# Patient Record
(demographics unavailable — no encounter records)

---

## 2020-01-01 NOTE — IPNPDOC
General


Date of Service:  2020


Day of Life:  2


Weight (G):  2762 (-122 g)





History


This child is a term female  who was admitted to the to the  

intensive care unit (NICU) from the delivery room for post-resuscitation


care and for treatment with IV antibiotics and evaluation for possible sepsis 

due to chorioamnionitis.  Mother is 23 years old,  2, now para 2.  Her 

blood type is O+.  Her group B strep screen was negative.  Her hepatitis B 

surface antigen, RPR and HIV status were all negative.  Rupture of membranes 

occurred 2 hours and 18 minutes prior to delivery with bloody and meconium-

stained amniotic fluid.  Labor was complicated by chorioamnionitis and 

nonreassuring fetal status. The child was delivered by  section (C-

section) under general anesthesia. A ruptured uterus was noted to be present at 

the time of delivery.  The child was given Apgar scores of 3 at one minute, 9 at

five minutes and 9 at ten minutes. She required bag and mask ventilation to 

establish a good respiratory effort and then admitted to the NICU for further 

care.





Vital Signs/I&O


Vital Signs





Vital Signs








  Date Time  Temp Pulse Resp B/P (MAP) Pulse Ox O2 Delivery O2 Flow Rate FiO2


 


20 07:28     100 HVNI-Vapotherm 3.0 21


 


20 05:00 97.2       


 


20 05:00  116 32 76/40 (52)    








Intake and Output











I & O 


 


 20





 06:00


 


Intake Total 256 ml


 


Output Total 190 ml


 


Balance 66 ml


 


 


 


Intake Oral 40 ml


 


IV Total 216 ml


 


Output Urine Total 190 ml


 


# Incontinent Voids 4


 


# Bowel Movements 8








Urine Output (Average mL/kg/hr:  3


Bowel Movements:  7





Physical Examination


Respiratory:  Positive: Good Bilateral Air Entry, Comfort Flow (3 L, 21 %)


Infectious Disease:  ampicillin, gentamicin


Cardiac:  Positive: S1, S2


Hematology:  Positive: hyperbilirubinemia, phototherapy


Metobolic/Abdominal:  Positive Soft


Neurological:  Positive: Good Tone


Extremities:  Positive: Full ROM Times 4


Skin:  Positive: Normal for Gestation





Laboratory Data


CBC/BMP/Bili





Laboratory Tests








Test


 20


20:53 20


06:26


 


Total Bilirubin


 4.5 MG/DL


(2.00-4.99) 5.0 MG/DL


(2.00-9.99)





Laboratory Tests


20 12:41








20 06:26











Feedings


What:  Formula





Other Medical Treatments


IV fluid, D10W at 80 ML/KG/day





Problems


Problems:  


(1) Liveborn by 


Assessment & Plan:  1. Baby is currently on IV fluids, D10W at 80 ML/KG/day.


2. Baby was spitting up with feeds, feeds changed to Similac sensitive with 

better tolerance.


3. Increase feeds to 10-15 ML by mouth every 3 hours





(2) Transient tachypnea of 


Assessment & Plan:  1. Baby developed respiratory distress soon after delivery.


2. Baby is currently on high flow nasal cannula 3 L, 21 %.





(3) Observation and evaluation of  for suspected infectious condition


Assessment & Plan:  1. Baby is on ampicillin 100 mg/kg per dose every 12 hours 

and gentamicin 4 mg/kg every 24 hours.


2. Blood culture is negative to date


3. Continue antibiotics and continue to follow blood culture, Gent trough in 

a.m.





(4)  hyperbilirubinemia


Assessment & Plan:  1. Baby had an elevated bilirubin level of 4.5 at 

approximately 12 hours of life and was started on phototherapy.


2. Continue phototherapy and continue to follow bilirubin levels








Current Medications





Current Medications








 Medications


  (Trade)  Dose


 Ordered  Sig/Daily


 Route


 PRN Reason  Start Time


 Stop Time Status Last Admin


Dose Admin


 


 Ampicillin Sodium


  (Omnipen)  140 mg  Q12H


 IV


   20 08:00


    20 08:58





 


 Dextrose  1,000 ml @ 


 9 mls/hr  Q24H


 IV


   20 06:50


    20 06:43





 


 Gentamicin


 Sulfate 11 mg/


 Dextrose  5.5 ml @ 


 10 mls/hr  Q24H


 IV


   20 07:00


    20 06:46














Allergies


Coded Allergies:  


     No Known Allergies (Unverified , 20)











ROSA AMES DO                2020 09:27

## 2020-01-01 NOTE — DS.PDOC
NICU Discharge Summary


General


Date of Birth


20


Date of Discharge


2020





Problem List


Problems:  


(1) Transient tachypnea of 


Problem text:  1. Baby developed respiratory distress soon after delivery.


2. Baby was placed on high flow nasal cannula which was weaned as tolerated and 

on day of life #3 baby was placed on room air.


3. Baby is currently breathing comfortably on room air in no distress.





(2) Liveborn by 


(3) Observation and evaluation of  for suspected infectious condition


Problem text:  1. Mother was diagnosed with chorioamnionitis during delivery.


2. CBC and blood culture were done and both were within normal limits.


3. Baby was treated for 5 days with ampicillin and gentamicin for suspected 

sepsis.


4. Baby is currently not showing any clinical signs or symptoms of  

sepsis.





(4)  hyperbilirubinemia


Problem text:  1. Baby had an elevated bilirubin level of 4.5 at approximately 

12 hours of life and was started on phototherapy.


2. Phototherapy was discontinued at a serum bilirubin level of 4.9, and rebound 

bilirubin level on 2020 is 6.2.








Procedures During Visit


Hearing screen and BiliChek were performed.





History


This child is a term female  who was admitted to the to the  

intensive care unit (NICU) from the delivery room for post-resuscitation


care and for treatment with IV antibiotics and evaluation for possible sepsis 

due to chorioamnionitis.  Mother is 23 years old,  2, now para 2.  Her 

blood type is O+.  Her group B strep screen was negative.  Her hepatitis B 

surface antigen, RPR and HIV status were all negative.  Rupture of membranes 

occurred 2 hours and 18 minutes prior to delivery with bloody and meconium-

stained amniotic fluid.  Labor was complicated by chorioamnionitis and 

nonreassuring fetal status. The child was delivered by  section (C-

section) under general anesthesia. A ruptured uterus was noted to be present at 

the time of delivery.  The child was given Apgar scores of 3 at one minute, 9 at

five minutes and 9 at ten minutes. She required bag and mask ventilation to 

establish a good respiratory effort and then admitted to the NICU for further 

care.





Physical Examination


Measurements on Admission


On admission, the baby's weight is 2838 grams, length is 51 cm, and head 

circumference is 33.5 cm.


General:  Positive: Active; 


   Negative: Respiratory Distress, Dysmorphic Features


HEENT:  Positive: Normocephalic, Anterior [place] Open, Positive Red Reflexes

Sav, Nares Patent, Ears Well Formed, Ears Well Set; 


   Negative: Cleft Lip, Cleft Palate


Heart:  Positive: S1,S2; 


   Negative: Murmur


Lungs:  Positive: Good Bilateral Air Entry; 


   Negative: Grunting and Retractions, Tachypnea


Abdomen:  Positive: Soft, Bowel sounds Present; 


   Negative: Distended


Female Genitalia:  Positive: Normal Term Genitalia


Anus:  Positive: Patent


Extremities:  Positive: Full ROM Times 4, Femoral Pulses; 


   Negative: Hip Click


Skin:  Positive: Normal for Gestation, Normal Capillary Refill


Neurological:  POSITIVE: Good Tone, Positive [place] Reflex, Positive Suck Reflex, 

Positive Grasp Reflex





Summary


On the day of discharge the baby's weight is 2832 g and the baby is tolerating 

full by mouth ad ye. feeds.


Baby is breathing comfortably on room air in no distress.


Physical exam is within normal limits.


The baby passed a  hearing screen and received the first dose of 

hepatitis B vaccine on 2020. The baby's blood type is O-.


The plan is to discharge the baby home with the mother and they will follow up 

with [facility] in 1-2 days.











ROSA AMES DO                2020 12:31

## 2020-01-01 NOTE — IPNPDOC
General


Date of Service:  2020


Day of Life:  1


Weight (G):  2884





History


This child is a term female  who was admitted to the to the  

intensive care unit (NICU) from the delivery room for post-resuscitation


care and for treatment with IV antibiotics and evaluation for possible sepsis 

due to chorioamnionitis.  Mother is 23 years old,  2, now para 2.  Her 

blood type is O+.  Her group B strep screen was negative.  Her hepatitis B 

surface antigen, RPR and HIV status were all negative.  Rupture of membranes 

occurred 2 hours and 18 minutes prior to delivery with bloody and meconium-

stained amniotic fluid.  Labor was complicated by chorioamnionitis and 

nonreassuring fetal status. The child was delivered by  section (C-

section) under general anesthesia. A ruptured uterus was noted to be present at 

the time of delivery.  The child was given Apgar scores of 3 at one minute, 9 at

five minutes and 9 at ten minutes. She required bag and mask ventilation to 

establish a good respiratory effort and then admitted to the NICU for further 

care.





Vital Signs/I&O


Vital Signs





Vital Signs








  Date Time  Temp Pulse Resp B/P (MAP) Pulse Ox O2 Delivery O2 Flow Rate FiO2


 


20 11:00 98.0 123 36 56/29 (38) 100 HVNI-Vapotherm 3.0 30








Intake and Output











I & O 


 


 20





 06:00


 


Intake Total 197.4 ml


 


Output Total 110 ml


 


Balance 87.4 ml


 


 


 


Intake Oral 20 ml


 


IV Total 177.4 ml


 


Output Urine Total 110 ml


 


# Incontinent Voids 2


 


# Bowel Movements 3








Urine Output (Average mL/kg/hr:  1.4


Bowel Movements:  5





Physical Examination


Respiratory:  Positive: Good Bilateral Air Entry, Comfort Flow (3 L, 30%)


Cardiac:  Positive: S1, S2


Hematology:  Positive: hyperbilirubinemia, phototherapy


Metobolic/Abdominal:  Positive Soft


Neurological:  Positive: Good Tone


Extremities:  Positive: Full ROM Times 4


Skin:  Positive: Normal for Gestation





Laboratory Data


CBC/BMP/Bili





Laboratory Tests








Test


 20


20:53 20


06:26


 


Total Bilirubin


 4.5 MG/DL


(2.00-4.99) 5.0 MG/DL


(2.00-9.99)





Laboratory Tests


20 12:41








20 06:26











Feedings


What:  Formula





Other Medical Treatments


IV fluids D10W at 80 ML's per KG per day





Problems


Problems:  


(1) Liveborn by 


(2) Transient tachypnea of 


Assessment & Plan:  1. Baby developed respiratory distress soon after delivery.


2. Baby is currently on high flow nasal cannula 3 L, 30%.


3. Wean FiO2 as tolerated.





(3) Observation and evaluation of  for suspected infectious condition


Assessment & Plan:  1. Baby is on ampicillin 100 mg/kg per dose every 12 hours 

and gentamicin 4 mg/kg every 24 hours.


2. Blood culture is negative to date


3. Continue antibiotics and continue to follow blood culture





(4)  hyperbilirubinemia


Assessment & Plan:  1. Baby had an elevated bilirubin level of 4.5 at 

approximately 12 hours of life and was started on phototherapy.


2. Continue phototherapy and continue to follow bilirubin levels








Current Medications





Current Medications








 Medications


  (Trade)  Dose


 Ordered  Sig/Daily


 Route


 PRN Reason  Start Time


 Stop Time Status Last Admin


Dose Admin


 


 Ampicillin Sodium


  (Omnipen)  140 mg  Q12H


 IV


   20 08:00


    20 07:39





 


 Dextrose  1,000 ml @ 


 9 mls/hr  Q24H


 IV


   20 06:50


    20 06:34





 


 Gentamicin


 Sulfate 11 mg/


 Dextrose  5.5 ml @ 


 10 mls/hr  Q24H


 IV


   20 07:00


    20 06:35




















ROSA AMES DO                2020 11:19

## 2020-01-01 NOTE — IPNPDOC
General


Date of Service:  2020


Day of Life:  5


Weight (G):  2794 (Plus 54 g)





History


This child is a term female  who was admitted to the to the  

intensive care unit (NICU) from the delivery room for post-resuscitation


care and for treatment with IV antibiotics and evaluation for possible sepsis 

due to chorioamnionitis.  Mother is 23 years old,  2, now para 2.  Her 

blood type is O+.  Her group B strep screen was negative.  Her hepatitis B 

surface antigen, RPR and HIV status were all negative.  Rupture of membranes 

occurred 2 hours and 18 minutes prior to delivery with bloody and meconium-

stained amniotic fluid.  Labor was complicated by chorioamnionitis and 

nonreassuring fetal status. The child was delivered by  section (C-

section) under general anesthesia. A ruptured uterus was noted to be present at 

the time of delivery.  The child was given Apgar scores of 3 at one minute, 9 at

five minutes and 9 at ten minutes. She required bag and mask ventilation to 

establish a good respiratory effort and then admitted to the NICU for further 

care.





Vital Signs/I&O


Vital Signs





Vital Signs








  Date Time  Temp Pulse Resp B/P (MAP) Pulse Ox O2 Delivery O2 Flow Rate FiO2


 


20 14:00 97.9 141 36  100 Room Air  


 


20 08:00    66/30 (42)    


 


20 08:00       3.0 21








Intake and Output











I & O 


 


 20





 06:00


 


Intake Total 495.4 ml


 


Output Total 320 ml


 


Balance 175.4 ml


 


 


 


Intake Oral 410 ml


 


IV Total 85.4 ml


 


Output Urine Total 320 ml


 


# Incontinent Voids 7


 


# Bowel Movements 6








Urine Output (Average mL/kg/hr:  4.3


Bowel Movements:  4





Physical Examination


Respiratory:  Positive: Good Bilateral Air Entry, Room Air


Infectious Disease:  ampicillin, gentamicin


Cardiac:  Positive: S1, S2


Hematology:  Positive: hyperbilirubinemia


Metobolic/Abdominal:  Positive Soft


Neurological:  Positive: Good Tone


Extremities:  Positive: Full ROM Times 4


Skin:  Positive: Normal for Gestation





Laboratory Data


CBC/BMP/Bili





Laboratory Tests








Test


 20


06:31


 


Total Bilirubin


 4.9 MG/DL


(2.00-12.00)











Feedings


What:  Formula





Problems


Problems:  


(1) Liveborn by 


Assessment & Plan:  1. Baby is currently on IV fluids, D10W at 3 ML/hour, 

discontinue IV fluids.


2. Baby is currently taking and tolerating Similac sensitive ad ye. every 3 

hours.


3. Continue ad ye. feeds and follow intake and tolerance





(2) Transient tachypnea of 


Assessment & Plan:  1. Baby developed respiratory distress soon after delivery.


2. Baby is currently on high flow nasal cannula 3 L, 21 %.


3. Try baby on room air.





(3) Observation and evaluation of  for suspected infectious condition


Assessment & Plan:  1. Baby is on ampicillin 100 mg/kg per dose every 12 hours 

and gentamicin 4 mg/kg every 24 hours.


2. Blood culture is negative to date


3. Discontinue antibiotics.





(4)  hyperbilirubinemia


Assessment & Plan:  1. Baby had an elevated bilirubin level of 4.5 at 

approximately 12 hours of life and was started on phototherapy.


2. Most recent bilirubin level is 4.9, discontinue phototherapy and follow 

rebound bilirubin level.








Current Medications





Current Medications








 Medications


  (Trade)  Dose


 Ordered  Sig/Daily


 Route


 PRN Reason  Start Time


 Stop Time Status Last Admin


Dose Admin


 


 Ampicillin Sodium


  (Omnipen)  140 mg  Q12H


 IV


   20 08:00


 20 11:25 DC 20 07:51





 


 Dextrose  1,000 ml @ 


 3 mls/hr  Q24H


 IV


   20 06:50


 20 11:25 DC 20 06:37





 


 Gentamicin


 Sulfate 11 mg/


 Dextrose  5.5 ml @ 


 10 mls/hr  Q24H


 IV


   20 07:00


 20 11:25 DC 20 06:37














Allergies


Coded Allergies:  


     No Known Allergies (Unverified , 20)











ROSA AMES DO                2020 14:57

## 2020-01-01 NOTE — IPNPDOC
General


Date of Service:  2020


Day of Life:  3


Weight (G):  2740 (-18 g)





History


This child is a term female  who was admitted to the to the  

intensive care unit (NICU) from the delivery room for post-resuscitation


care and for treatment with IV antibiotics and evaluation for possible sepsis 

due to chorioamnionitis.  Mother is 23 years old,  2, now para 2.  Her 

blood type is O+.  Her group B strep screen was negative.  Her hepatitis B 

surface antigen, RPR and HIV status were all negative.  Rupture of membranes 

occurred 2 hours and 18 minutes prior to delivery with bloody and meconium-

stained amniotic fluid.  Labor was complicated by chorioamnionitis and 

nonreassuring fetal status. The child was delivered by  section (C-

section) under general anesthesia. A ruptured uterus was noted to be present at 

the time of delivery.  The child was given Apgar scores of 3 at one minute, 9 at

five minutes and 9 at ten minutes. She required bag and mask ventilation to 

establish a good respiratory effort and then admitted to the NICU for further 

care.





Vital Signs/I&O


Vital Signs





Vital Signs








  Date Time  Temp Pulse Resp B/P (MAP) Pulse Ox O2 Delivery O2 Flow Rate FiO2


 


20 08:00 96.8       


 


20 08:00     100 HVNI-Vapotherm 3.0 21


 


20 08:00  110 36 66/34 (45)    








Intake and Output











I & O 


 


 20





 05:59


 


Intake Total 319 ml


 


Output Total 245 ml


 


Balance 74 ml


 


 


 


Intake Oral 112 ml


 


IV Total 207 ml


 


Output Urine Total 245 ml


 


# Incontinent Voids 8


 


# Bowel Movements 2








Urine Output (Average mL/kg/hr:  3.2


Bowel Movements:  4





Physical Examination


Respiratory:  Positive: Good Bilateral Air Entry, Comfort Flow (3 L, 21 %)


Infectious Disease:  ampicillin, gentamicin


Cardiac:  Positive: S1, S2


Hematology:  Positive: hyperbilirubinemia, phototherapy


Metobolic/Abdominal:  Positive Soft


Neurological:  Positive: Good Tone


Extremities:  Positive: Full ROM Times 4


Skin:  Positive: Normal for Gestation





Laboratory Data


CBC/BMP/Bili





Laboratory Tests








Test


 20


20:53 20


06:26 20


06:31


 


Total Bilirubin


 4.5 MG/DL


(2.00-4.99) 5.0 MG/DL


(2.00-9.99) 4.9 MG/DL


(2.00-12.00)





Laboratory Tests


20 12:41








20 06:26











Feedings


What:  Formula





Problems


Problems:  


(1) Liveborn by 


Assessment & Plan:  1. Baby is currently on IV fluids, D10W at 80 ML/KG/day, 

decrease IV rate to 5 ML/hour.


2. Baby is currently taking and tolerating Similac sensitive 15 ML every 3 ho

urs.


3. Increase feeds to 25 ML by mouth every 3 hours





(2) Transient tachypnea of 


Assessment & Plan:  1. Baby developed respiratory distress soon after delivery.


2. Baby is currently on high flow nasal cannula 3 L, 21 %.


3. Try baby on room air.





(3) Observation and evaluation of  for suspected infectious condition


Assessment & Plan:  1. Baby is on ampicillin 100 mg/kg per dose every 12 hours 

and gentamicin 4 mg/kg every 24 hours.


2. Blood culture is negative to date


3. Gent trough is 0.7, Continue antibiotics day #3/5.





(4)  hyperbilirubinemia


Assessment & Plan:  1. Baby had an elevated bilirubin level of 4.5 at 

approximately 12 hours of life and was started on phototherapy.


2. Current bilirubin level is 4.9, continue phototherapy and continue to follow 

bilirubin levels








Current Medications





Current Medications








 Medications


  (Trade)  Dose


 Ordered  Sig/Daily


 Route


 PRN Reason  Start Time


 Stop Time Status Last Admin


Dose Admin


 


 Ampicillin Sodium


  (Omnipen)  140 mg  Q12H


 IV


   20 08:00


    20 08:16





 


 Dextrose  1,000 ml @ 


 5 mls/hr  Q24H


 IV


   20 06:50


    20 06:50





 


 Gentamicin


 Sulfate 11 mg/


 Dextrose  5.5 ml @ 


 10 mls/hr  Q24H


 IV


   20 07:00


    20 07:16














Allergies


Coded Allergies:  


     No Known Allergies (Unverified , 20)











ROAS AMES DO                2020 10:14

## 2020-01-01 NOTE — IPNPDOC
General


Date of Service:  2020


Day of Life:  4


Weight (G):  2740





History


This child is a term female  who was admitted to the to the  

intensive care unit (NICU) from the delivery room for post-resuscitation


care and for treatment with IV antibiotics and evaluation for possible sepsis 

due to chorioamnionitis.  Mother is 23 years old,  2, now para 2.  Her 

blood type is O+.  Her group B strep screen was negative.  Her hepatitis B 

surface antigen, RPR and HIV status were all negative.  Rupture of membranes 

occurred 2 hours and 18 minutes prior to delivery with bloody and meconium-

stained amniotic fluid.  Labor was complicated by chorioamnionitis and 

nonreassuring fetal status. The child was delivered by  section (C-

section) under general anesthesia. A ruptured uterus was noted to be present at 

the time of delivery.  The child was given Apgar scores of 3 at one minute, 9 at

five minutes and 9 at ten minutes. She required bag and mask ventilation to 

establish a good respiratory effort and then admitted to the NICU for further 

care.





Vital Signs/I&O


Vital Signs





Vital Signs








  Date Time  Temp Pulse Resp B/P (MAP) Pulse Ox O2 Delivery O2 Flow Rate FiO2


 


20 08:00 96.8       


 


20 08:00  121 46 63/32 (42) 100 Room Air  


 


20 08:00       3.0 21








Intake and Output











I & O 


 


 20





 05:59


 


Intake Total 326.5 ml


 


Output Total 255 ml


 


Balance 71.5 ml


 


 


 


Intake Oral 190 ml


 


IV Total 136.5 ml


 


Output Urine Total 255 ml


 


# Incontinent Voids 3


 


# Bowel Movements 3








Urine Output (Average mL/kg/hr:  4.4


Bowel Movements:  3





Physical Examination


Respiratory:  Positive: Good Bilateral Air Entry, Room Air


Infectious Disease:  ampicillin, gentamicin


Cardiac:  Positive: S1, S2


Hematology:  Positive: hyperbilirubinemia, phototherapy


Metobolic/Abdominal:  Positive Soft


Neurological:  Positive: Good Tone


Extremities:  Positive: Full ROM Times 4


Skin:  Positive: Normal for Gestation





Laboratory Data


CBC/BMP/Bili





Laboratory Tests








Test


 20


06:26 20


06:31


 


Total Bilirubin


 5.0 MG/DL


(2.00-9.99) 4.9 MG/DL


(2.00-12.00)





Laboratory Tests


20 06:26











Feedings


What:  Formula





Problems


Problems:  


(1) Liveborn by 


Assessment & Plan:  1. Baby is currently on IV fluids, D10W at  5 ML/hour, 

decrease rate to 3ML/hour.


2. Baby is currently taking and tolerating Similac sensitive 25 ML every 3 

hours.


3. Go to ad ye. feeds and follow intake and tolerance





(2) Transient tachypnea of 


Assessment & Plan:  1. Baby developed respiratory distress soon after delivery.


2. Baby is currently on high flow nasal cannula 3 L, 21 %.


3. Try baby on room air.





(3) Observation and evaluation of  for suspected infectious condition


Assessment & Plan:  1. Baby is on ampicillin 100 mg/kg per dose every 12 hours 

and gentamicin 4 mg/kg every 24 hours.


2. Blood culture is negative to date


3.  Continue antibiotics day #4/5.





(4)  hyperbilirubinemia


Assessment & Plan:  1. Baby had an elevated bilirubin level of 4.5 at 

approximately 12 hours of life and was started on phototherapy.


2. Most recent bilirubin level is 4.9, discontinue phototherapy and follow 

rebound bilirubin level.








Current Medications





Current Medications








 Medications


  (Trade)  Dose


 Ordered  Sig/Daily


 Route


 PRN Reason  Start Time


 Stop Time Status Last Admin


Dose Admin


 


 Ampicillin Sodium


  (Omnipen)  140 mg  Q12H


 IV


   20 08:00


    20 07:54





 


 Dextrose  1,000 ml @ 


 5 mls/hr  Q24H


 IV


   20 06:50


    20 06:43





 


 Gentamicin


 Sulfate 11 mg/


 Dextrose  5.5 ml @ 


 10 mls/hr  Q24H


 IV


   20 07:00


    20 07:20














Allergies


Coded Allergies:  


     No Known Allergies (Unverified , 20)











ROSA AMES DO                2020 10:12

## 2020-01-01 NOTE — HPE
DATE OF BIRTH AND DATE OF ADMISSION:  2020

 

HISTORY:  This child is a term female  who was admitted to the to the

 intensive care unit (NICU) from the delivery room for post-resuscitation

care and for treatment with IV antibiotics and evaluation for possible sepsis due

to chorioamnionitis.  Mother is 23 years old,  2, now para 2.  Her blood

type is O+.  Her group B strep screen was negative.  Her hepatitis B surface

antigen, RPR and HIV status were all negative.  Rupture of membranes occurred 2

hours and 18 minutes prior to delivery with bloody and meconium-stained amniotic

fluid.  Labor was complicated by chorioamnionitis and nonreassuring fetal status.

The child was delivered by  section () under general anesthesia.

A ruptured uterus was noted to be present at the time of delivery.  The child was

given Apgar scores of 3 at one minute, 9 at five minutes and 9 at ten minutes.

She required bag and mask ventilation to establish a good respiratory effort.

 

PHYSICAL EXAM ON ADMISSION TO NICU:  Birth weight 2838 grams, length 51 cm, head

circumference 33.5 cm.

General impression:  Early term female, exam suggestive of 37 weeks' gestational

age, active and responsive, pale with fair perfusion.  No dysmorphic features.

HEENT:  [place] open and soft, normocephalic.  Red reflex present in both

eyes.  Lungs:  Good respiratory effort.  Clear breath sounds with good aeration.

 

Heart:  Regular with no murmur.

Abdomen:  Soft and nondistended.

Genitalia:  Normal female.

Hips:  Stable with normal Ortolani and Boland maneuvers.

Neurologic:  Improving muscle tone.

 

IMPRESSION:

1.  Early term female  delivered by .  This child was delivered

by .  Her physical exam was consistent with 37 weeks gestational age.

 

2.  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  The child was given Apgar scores of 3 at one minute, 9

at five minutes and 9 at ten minutes.  She required brief bag and mask

ventilation to establish a good respiratory effort.  She responded well to

resuscitation and is currently active and responsive with a good respiratory

effort and improving muscle tone.  We are providing her with respiratory support

at Vapotherm 5 liters per minute flow and 30% FiO2 to help her continue to

successfully transition.

 

3.  Rule out sepsis.  The risks factors for possible sepsis are chorioamnionitis

and depression at birth.  The child was noted to have a very foul smell at the

time of delivery.  We will evaluate the child with a CBC with differential and a

blood culture.  We will treat her with ampicillin and gentamicin pending the

results and further clinical evaluation.